# Patient Record
Sex: FEMALE | Race: WHITE | NOT HISPANIC OR LATINO | Employment: FULL TIME | URBAN - METROPOLITAN AREA
[De-identification: names, ages, dates, MRNs, and addresses within clinical notes are randomized per-mention and may not be internally consistent; named-entity substitution may affect disease eponyms.]

---

## 2023-02-21 LAB
ALANINE AMINOTRANSFERASE (SGPT) (U/L) IN SER/PLAS: 11 U/L (ref 7–45)
ALBUMIN (G/DL) IN SER/PLAS: 4.6 G/DL (ref 3.4–5)
ALKALINE PHOSPHATASE (U/L) IN SER/PLAS: 64 U/L (ref 33–110)
ANION GAP IN SER/PLAS: 12 MMOL/L (ref 10–20)
ASPARTATE AMINOTRANSFERASE (SGOT) (U/L) IN SER/PLAS: 14 U/L (ref 9–39)
BILIRUBIN TOTAL (MG/DL) IN SER/PLAS: 0.7 MG/DL (ref 0–1.2)
CALCIDIOL (25 OH VITAMIN D3) (NG/ML) IN SER/PLAS: 16 NG/ML
CALCIUM (MG/DL) IN SER/PLAS: 9.6 MG/DL (ref 8.6–10.6)
CARBON DIOXIDE, TOTAL (MMOL/L) IN SER/PLAS: 28 MMOL/L (ref 21–32)
CHLORIDE (MMOL/L) IN SER/PLAS: 103 MMOL/L (ref 98–107)
CHOLESTEROL (MG/DL) IN SER/PLAS: 195 MG/DL (ref 0–199)
CHOLESTEROL IN HDL (MG/DL) IN SER/PLAS: 61.5 MG/DL
CHOLESTEROL/HDL RATIO: 3.2
CREATININE (MG/DL) IN SER/PLAS: 0.93 MG/DL (ref 0.5–1.05)
ERYTHROCYTE DISTRIBUTION WIDTH (RATIO) BY AUTOMATED COUNT: 13.2 % (ref 11.5–14.5)
ERYTHROCYTE MEAN CORPUSCULAR HEMOGLOBIN CONCENTRATION (G/DL) BY AUTOMATED: 31.5 G/DL (ref 32–36)
ERYTHROCYTE MEAN CORPUSCULAR VOLUME (FL) BY AUTOMATED COUNT: 88 FL (ref 80–100)
ERYTHROCYTES (10*6/UL) IN BLOOD BY AUTOMATED COUNT: 5.15 X10E12/L (ref 4–5.2)
GFR FEMALE: 80 ML/MIN/1.73M2
GLUCOSE (MG/DL) IN SER/PLAS: 83 MG/DL (ref 74–99)
HEMATOCRIT (%) IN BLOOD BY AUTOMATED COUNT: 45.1 % (ref 36–46)
HEMOGLOBIN (G/DL) IN BLOOD: 14.2 G/DL (ref 12–16)
LDL: 118 MG/DL (ref 0–99)
LEUKOCYTES (10*3/UL) IN BLOOD BY AUTOMATED COUNT: 5.7 X10E9/L (ref 4.4–11.3)
NRBC (PER 100 WBCS) BY AUTOMATED COUNT: 0 /100 WBC (ref 0–0)
PLATELETS (10*3/UL) IN BLOOD AUTOMATED COUNT: 247 X10E9/L (ref 150–450)
POTASSIUM (MMOL/L) IN SER/PLAS: 4.6 MMOL/L (ref 3.5–5.3)
PROTEIN TOTAL: 7.2 G/DL (ref 6.4–8.2)
SODIUM (MMOL/L) IN SER/PLAS: 138 MMOL/L (ref 136–145)
TRIGLYCERIDE (MG/DL) IN SER/PLAS: 79 MG/DL (ref 0–149)
UREA NITROGEN (MG/DL) IN SER/PLAS: 11 MG/DL (ref 6–23)
VLDL: 16 MG/DL (ref 0–40)

## 2024-03-26 ENCOUNTER — TELEPHONE (OUTPATIENT)
Dept: PRIMARY CARE | Facility: CLINIC | Age: 39
End: 2024-03-26
Payer: COMMERCIAL

## 2024-03-26 DIAGNOSIS — E03.9 HYPOTHYROIDISM, ADULT: Primary | ICD-10-CM

## 2024-03-26 RX ORDER — SULFACETAMIDE SODIUM AND SULFUR 9; 4.5 MG/G; MG/G
RINSE TOPICAL
COMMUNITY
Start: 2024-01-31

## 2024-03-26 RX ORDER — ERGOCALCIFEROL 1.25 MG/1
50000 CAPSULE ORAL
COMMUNITY
End: 2024-06-05 | Stop reason: ALTCHOICE

## 2024-03-26 RX ORDER — SERTRALINE HYDROCHLORIDE 100 MG/1
TABLET, FILM COATED ORAL
COMMUNITY

## 2024-03-26 RX ORDER — LEVOTHYROXINE SODIUM 175 UG/1
175 TABLET ORAL DAILY
COMMUNITY
End: 2024-03-26 | Stop reason: SDUPTHER

## 2024-03-26 RX ORDER — SPIRONOLACTONE 50 MG/1
TABLET, FILM COATED ORAL
COMMUNITY

## 2024-03-26 RX ORDER — LEVOTHYROXINE SODIUM 175 UG/1
175 TABLET ORAL DAILY
Qty: 90 TABLET | Refills: 0 | Status: SHIPPED | OUTPATIENT
Start: 2024-03-26 | End: 2024-04-02

## 2024-04-02 DIAGNOSIS — E03.9 HYPOTHYROIDISM, ADULT: ICD-10-CM

## 2024-04-02 RX ORDER — LEVOTHYROXINE SODIUM 175 UG/1
175 TABLET ORAL DAILY
Qty: 90 TABLET | Refills: 0 | Status: SHIPPED | OUTPATIENT
Start: 2024-04-02 | End: 2024-06-07 | Stop reason: SDUPTHER

## 2024-06-05 ENCOUNTER — OFFICE VISIT (OUTPATIENT)
Dept: ENDOCRINOLOGY | Facility: CLINIC | Age: 39
End: 2024-06-05
Payer: COMMERCIAL

## 2024-06-05 ENCOUNTER — LAB (OUTPATIENT)
Dept: LAB | Facility: LAB | Age: 39
End: 2024-06-05
Payer: COMMERCIAL

## 2024-06-05 VITALS
BODY MASS INDEX: 27.5 KG/M2 | WEIGHT: 196.4 LBS | RESPIRATION RATE: 16 BRPM | SYSTOLIC BLOOD PRESSURE: 112 MMHG | HEIGHT: 71 IN | DIASTOLIC BLOOD PRESSURE: 60 MMHG | HEART RATE: 61 BPM

## 2024-06-05 DIAGNOSIS — E03.9 HYPOTHYROIDISM, UNSPECIFIED TYPE: ICD-10-CM

## 2024-06-05 DIAGNOSIS — C73 THYROID CANCER (MULTI): ICD-10-CM

## 2024-06-05 DIAGNOSIS — C73 THYROID CANCER (MULTI): Primary | ICD-10-CM

## 2024-06-05 LAB
T4 FREE SERPL-MCNC: 1.44 NG/DL (ref 0.78–1.48)
TSH SERPL-ACNC: 0.68 MIU/L (ref 0.44–3.98)

## 2024-06-05 PROCEDURE — 86800 THYROGLOBULIN ANTIBODY: CPT

## 2024-06-05 PROCEDURE — 84439 ASSAY OF FREE THYROXINE: CPT

## 2024-06-05 PROCEDURE — 1036F TOBACCO NON-USER: CPT | Performed by: INTERNAL MEDICINE

## 2024-06-05 PROCEDURE — 36415 COLL VENOUS BLD VENIPUNCTURE: CPT

## 2024-06-05 PROCEDURE — 84432 ASSAY OF THYROGLOBULIN: CPT

## 2024-06-05 PROCEDURE — 84443 ASSAY THYROID STIM HORMONE: CPT

## 2024-06-05 PROCEDURE — 99213 OFFICE O/P EST LOW 20 MIN: CPT | Performed by: INTERNAL MEDICINE

## 2024-06-05 RX ORDER — TRETINOIN 0.25 MG/G
CREAM TOPICAL
COMMUNITY
Start: 2024-03-28 | End: 2024-06-05 | Stop reason: ALTCHOICE

## 2024-06-05 ASSESSMENT — ENCOUNTER SYMPTOMS
SHORTNESS OF BREATH: 0
VOMITING: 0
FEVER: 0
COUGH: 0
HEADACHES: 0
DIARRHEA: 0
NAUSEA: 0
CHILLS: 0
PALPITATIONS: 0
FATIGUE: 0

## 2024-06-05 NOTE — PROGRESS NOTES
Endocrinology: Follow up visit  Subjective   Patient ID: Catrina Bonilla is a 38 y.o. female who presents for Hypothyroidism and Thyroid Cancer.    PCP: Katie Kelly, DO [place] last visit  doing well.   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  Weight down 10 lbs.   Taking t4 as directed.  Feels fine.    PTC 4 cm. s/p completion thyroidectomy and ovalles 4/2018.  2019: thyrogen testing: tg negative  2022 us ok    Review of Systems   Constitutional:  Negative for chills, fatigue and fever.   Respiratory:  Negative for cough and shortness of breath.    Cardiovascular:  Negative for chest pain and palpitations.   Gastrointestinal:  Negative for diarrhea, nausea and vomiting.   Neurological:  Negative for headaches.       There is no problem list on file for this patient.       Home Meds:  Current Outpatient Medications   Medication Instructions    sertraline (Zoloft) 100 mg tablet TAKE 1 TABLET BY MOUTH EVERY DAY IN THE MORNING AS DIRECTED    spironolactone (Aldactone) 50 mg tablet TAKE ONE TABLET DAILY. MAKE SURE TO STAY HYDRATED WHILE TAKING THIS MEDICATION.    sulfacetamide sodium-sulfur 9-4.5 % cleanser WASH FACE & BACK ONCE DAILY.    Synthroid 175 mcg, oral, Daily        Allergies   Allergen Reactions    Adhesive Tape-Silicones Rash        Objective   Vitals:    06/05/24 0951   BP: 112/60   Pulse: 61   Resp: 16      Vitals:    06/05/24 0951   Weight: 89.1 kg (196 lb 6.4 oz)      Body mass index is 27.39 kg/m².   Physical Exam  Constitutional:       Appearance: Normal appearance. She is overweight.   HENT:      Head: Normocephalic and atraumatic.   Neck:      Comments: No palpable thyroid gland  Cardiovascular:      Rate and Rhythm: Normal rate and regular rhythm.      Heart sounds: No murmur heard.     No gallop.   Pulmonary:      Effort: Pulmonary effort is normal.      Breath sounds: Normal breath sounds.   Abdominal:      Palpations: Abdomen is soft.      Comments: benign   Neurological:       "General: No focal deficit present.      Mental Status: She is alert and oriented to person, place, and time.      Deep Tendon Reflexes: Reflexes are normal and symmetric.   Psychiatric:         Behavior: Behavior is cooperative.         Labs:  Lab Results   Component Value Date    HGBA1C 4.8 02/06/2023    TSH 0.46 02/06/2023    FREET4 1.17 02/06/2023      No results found for: \"PR1\", \"THYROIDPAB\", \"TSI\"     Assessment/Plan   Problem List Items Addressed This Visit    None  Visit Diagnoses       Thyroid cancer (Multi)    -  Primary    Relevant Orders    Thyroid Stimulating Hormone    Thyroxine, Free    Thyroglobulin and Antithyroglobulin    US neck    Hypothyroidism, unspecified type              Labs and us due  Discussed 5 yrs of surveillance with us: if this years is ok will only need yearly labs  Follow up in one year    Electronically signed by:  Salima Trejo MD 06/05/24 10:18 AM              "

## 2024-06-07 DIAGNOSIS — E03.9 HYPOTHYROIDISM, ADULT: ICD-10-CM

## 2024-06-07 LAB
BILL ONLY-THYROGLOBULIN: NORMAL
THYROGLOB AB SERPL-ACNC: <0.9 IU/ML (ref 0–4)
THYROGLOB SERPL-MCNC: <0.1 NG/ML (ref 1.3–31.8)
THYROGLOB SERPL-MCNC: ABNORMAL NG/ML (ref 1.3–31.8)

## 2024-06-07 RX ORDER — LEVOTHYROXINE SODIUM 175 UG/1
175 TABLET ORAL DAILY
Qty: 90 TABLET | Refills: 3 | Status: SHIPPED | OUTPATIENT
Start: 2024-06-07

## 2024-06-18 ENCOUNTER — HOSPITAL ENCOUNTER (OUTPATIENT)
Dept: RADIOLOGY | Facility: CLINIC | Age: 39
Discharge: HOME | End: 2024-06-18
Payer: COMMERCIAL

## 2024-06-18 DIAGNOSIS — C73 THYROID CANCER (MULTI): ICD-10-CM

## 2024-06-18 PROCEDURE — 76536 US EXAM OF HEAD AND NECK: CPT

## 2024-06-18 PROCEDURE — 76536 US EXAM OF HEAD AND NECK: CPT | Performed by: STUDENT IN AN ORGANIZED HEALTH CARE EDUCATION/TRAINING PROGRAM

## 2024-06-25 ENCOUNTER — OFFICE VISIT (OUTPATIENT)
Dept: ORTHOPEDIC SURGERY | Facility: HOSPITAL | Age: 39
End: 2024-06-25
Payer: COMMERCIAL

## 2024-06-25 ENCOUNTER — HOSPITAL ENCOUNTER (OUTPATIENT)
Dept: RADIOLOGY | Facility: HOSPITAL | Age: 39
Discharge: HOME | End: 2024-06-25
Payer: COMMERCIAL

## 2024-06-25 VITALS — HEIGHT: 71 IN | WEIGHT: 196 LBS | BODY MASS INDEX: 27.44 KG/M2

## 2024-06-25 DIAGNOSIS — M54.50 LUMBAR PAIN WITH RADIATION DOWN LEFT LEG: Primary | ICD-10-CM

## 2024-06-25 DIAGNOSIS — M54.50 LUMBAR PAIN WITH RADIATION DOWN LEFT LEG: ICD-10-CM

## 2024-06-25 DIAGNOSIS — M79.605 LUMBAR PAIN WITH RADIATION DOWN LEFT LEG: ICD-10-CM

## 2024-06-25 DIAGNOSIS — M79.605 LUMBAR PAIN WITH RADIATION DOWN LEFT LEG: Primary | ICD-10-CM

## 2024-06-25 PROCEDURE — 99204 OFFICE O/P NEW MOD 45 MIN: CPT

## 2024-06-25 PROCEDURE — 72110 X-RAY EXAM L-2 SPINE 4/>VWS: CPT | Performed by: RADIOLOGY

## 2024-06-25 PROCEDURE — 99214 OFFICE O/P EST MOD 30 MIN: CPT

## 2024-06-25 PROCEDURE — 72110 X-RAY EXAM L-2 SPINE 4/>VWS: CPT

## 2024-06-25 RX ORDER — MELOXICAM 15 MG/1
15 TABLET ORAL DAILY
Qty: 30 TABLET | Refills: 0 | Status: SHIPPED | OUTPATIENT
Start: 2024-06-25 | End: 2024-07-25

## 2024-06-25 RX ORDER — METHOCARBAMOL 500 MG/1
500 TABLET, FILM COATED ORAL 3 TIMES DAILY PRN
Qty: 60 TABLET | Refills: 2 | Status: SHIPPED | OUTPATIENT
Start: 2024-06-25 | End: 2024-08-24

## 2024-06-25 RX ORDER — METHYLPREDNISOLONE 4 MG/1
TABLET ORAL
Qty: 21 TABLET | Refills: 1 | Status: SHIPPED | OUTPATIENT
Start: 2024-06-25 | End: 2024-07-02

## 2024-06-25 ASSESSMENT — PAIN DESCRIPTION - DESCRIPTORS: DESCRIPTORS: BURNING;ACHING;SHARP;SHOOTING

## 2024-06-25 ASSESSMENT — PAIN SCALES - GENERAL: PAINLEVEL_OUTOF10: 10 - WORST POSSIBLE PAIN

## 2024-06-25 ASSESSMENT — PAIN - FUNCTIONAL ASSESSMENT: PAIN_FUNCTIONAL_ASSESSMENT: 0-10

## 2024-06-25 NOTE — PROGRESS NOTES
Catrina Bonilla  is a 38 y.o. year-old  female. she  is a new patient to our office and presents with a chief complaint of  low back and left leg pain. Hx of lumbar back injections in 2018 with goo drelief lasting until onset of her symptoms with started 2-3 weeks ago after lifting heavy objects.   Denies bowl bladder dysfunction numbness tingling weakness or falls.     Pain:        Severity:  Catrina Bonilla states pain is: 10/10 at it's worst. On average pain is 5/10 (Scale 0-no pain, 10-worst pain)      Quality:  sharp.       Radiating: lateral food posterior thigh       Aggravating Factors: walking      Alleviating Factors:  rest.      Associated symptoms:  no numbness, or tingling in the bilateral lower extremities; no balance problems or new bowel/bladder problems.    Physical Therapy/Occupational Therapy/Other Modalities:  hasn't tried      Topicals: hasn't helped     Medications:  NSAIDs: helpful   Steroid: hasn't tried  Muscle relaxer: hasn't tried       Past Medical, Family, and Social History reviewed   Review of Systems  A complete review of systems was conducted, pertinent only to the HPI noted above.  Constitutional: None  Eyes: No additions to above history  Ears, Nose, Throat: No additions to above history  Cardiovascular: No additions to above history  Respiratory: No additions to above history  GI: No additions to above history  : No additions to above history  Skin/Neuro: No additions to above history  Endocrine/Heme/Lymph: No additions to above history  Immunologic: No additions to above history  Psychiatric: No additions to above history  Musculoskeletal: see above    GEN: Alert and Oriented x 3  Constitutional: Well appearing , in no apparent distress.  Skin: No rashes, erythema, or induration around knee    Gait:   - Antalgic slow   - Without assistive device   - able to heel walk, able to toe walk    Inspection:   - Scoliosis/Kyphosis: no  - No erythema, swelling/edema, ecchymosis or deformity  noted  - normal muscle bulk of bilateral lower extremity     Sensation:  - Intact to light touch in bilateral lower extremity    Palpation:  - No tenderness to palpation of bilateral greater trochanter  - tenderness to palpation of left sacroiliac joint  - No tenderness to palpation of bilateral spinal paraspinals at the level of L4-L5  - No tenderness to palpation of spinous process at the level of L4-L5    Range of Motion:  - Full painful thoracolumbar flexion>extension  - Full painless bilateral hip flexion/internal rotation/external rotation    Strength:  Side Hip Flexion (L2) Knee Extension (L3) Hip Adduction  (L2-L4) Hip Abduction  (L5-S1)   Right 5 5 5 5   Left 5 5 5 5     Reflexes:  - Disc/Nerve root: (SLR): painful but negative 2  - Facet loading: neg b/l   - SI Joint (Compression, Distraction, Gaenslen, and Thigh thrust): neg   - KORINA: neg   - FADIR: neg       Assessment     Catrina is a 38 y.o. female with significant past medical history of thyroid cancer, who presents with radicular low back pain .  The patient's symptoms, clinical exam and imaging studies are suggestive of L4-L5 lumbar radiculitis.  The other possible differential diagnosis(es) include(s):  discogenic pain, myofacial pain .      Plan     At this time, I would like to make the following recommendation/plan:  1.  Physical Therapy: referral placed   2.  Medication: meloxicam, methocarbamol, medrol dose pack(2x) discussed risk and benefits and proper administration.   3.  Injections: nbot idicated at this time. .  4.  Imaging: Interpreted Xray of lumbar spine 06/25/24: curved lumbar spine likely postural L4-L5 significant disc space narrowing with calcific disc (suspected). Spondylosis of lower lumbar spine.   5.  Referrals: medical  spine. Patient provided office contact info.       Imaging and Other Studies:    US neck    Result Date: 6/19/2024  Interpreted By:  Jacek Alcantar, STUDY: US NECK;  6/18/2024 10:49 am   INDICATION:  Signs/Symptoms:thyroid cancer.   COMPARISON: 08/09/2022   ACCESSION NUMBER(S): [id number]   ORDERING CLINICIAN: ALON TRACEY   TECHNIQUE: Multiple grayscale and color Doppler static and dynamic images of the neck were obtained at the symptomatic area of clinically suspected abnormality.   FINDINGS: Status post thyroidectomy. No suspicious mass within the thyroidectomy bed.   Bilateral neck ultrasound, demonstrates multiple predominantly benign-appearing lymph nodes with varying degree of fatty hilum. On the right there is a 12 x 6 x 14 mm level 1 node, a 15 x 7 x 8 mm level 2A node and a 17 x 5 x 9 mm level 2 B nodes. The right level 2 nodes have not significantly changed in size and morphology since 2022.   On the left there is a 9 x 6 x 10 mm level 2A node, previously 9 x 5 x 5 mm. Follow-up as clinically indicated.       See above.       Signed by: Jacek Alcantar 6/19/2024 10:23 PM Dictation workstation:   [id number]

## 2024-06-27 ENCOUNTER — HOSPITAL ENCOUNTER (EMERGENCY)
Facility: HOSPITAL | Age: 39
Discharge: HOME | End: 2024-06-27
Attending: STUDENT IN AN ORGANIZED HEALTH CARE EDUCATION/TRAINING PROGRAM
Payer: COMMERCIAL

## 2024-06-27 ENCOUNTER — APPOINTMENT (OUTPATIENT)
Dept: RADIOLOGY | Facility: HOSPITAL | Age: 39
End: 2024-06-27
Payer: COMMERCIAL

## 2024-06-27 VITALS
TEMPERATURE: 97.7 F | DIASTOLIC BLOOD PRESSURE: 64 MMHG | RESPIRATION RATE: 14 BRPM | SYSTOLIC BLOOD PRESSURE: 100 MMHG | HEART RATE: 56 BPM | BODY MASS INDEX: 27.44 KG/M2 | OXYGEN SATURATION: 96 % | HEIGHT: 71 IN | WEIGHT: 196 LBS

## 2024-06-27 DIAGNOSIS — M54.16 LUMBAR RADICULOPATHY, ACUTE: ICD-10-CM

## 2024-06-27 DIAGNOSIS — M51.36 DEGENERATIVE DISC DISEASE, LUMBAR: Primary | ICD-10-CM

## 2024-06-27 LAB — HCG UR QL IA.RAPID: NEGATIVE

## 2024-06-27 PROCEDURE — 72131 CT LUMBAR SPINE W/O DYE: CPT

## 2024-06-27 PROCEDURE — 2500000004 HC RX 250 GENERAL PHARMACY W/ HCPCS (ALT 636 FOR OP/ED): Performed by: PHYSICIAN ASSISTANT

## 2024-06-27 PROCEDURE — 96372 THER/PROPH/DIAG INJ SC/IM: CPT | Performed by: PHYSICIAN ASSISTANT

## 2024-06-27 PROCEDURE — 81025 URINE PREGNANCY TEST: CPT | Performed by: PHYSICIAN ASSISTANT

## 2024-06-27 PROCEDURE — 99284 EMERGENCY DEPT VISIT MOD MDM: CPT | Mod: 25

## 2024-06-27 PROCEDURE — 72131 CT LUMBAR SPINE W/O DYE: CPT | Performed by: RADIOLOGY

## 2024-06-27 PROCEDURE — 2500000001 HC RX 250 WO HCPCS SELF ADMINISTERED DRUGS (ALT 637 FOR MEDICARE OP): Performed by: PHYSICIAN ASSISTANT

## 2024-06-27 RX ORDER — OXYCODONE AND ACETAMINOPHEN 5; 325 MG/1; MG/1
1 TABLET ORAL ONCE
Status: COMPLETED | OUTPATIENT
Start: 2024-06-27 | End: 2024-06-27

## 2024-06-27 RX ORDER — OXYCODONE AND ACETAMINOPHEN 5; 325 MG/1; MG/1
1 TABLET ORAL EVERY 6 HOURS
Qty: 12 TABLET | Refills: 0 | Status: SHIPPED | OUTPATIENT
Start: 2024-06-27 | End: 2024-06-30

## 2024-06-27 RX ORDER — ORPHENADRINE CITRATE 30 MG/ML
30 INJECTION INTRAMUSCULAR; INTRAVENOUS ONCE
Status: COMPLETED | OUTPATIENT
Start: 2024-06-27 | End: 2024-06-27

## 2024-06-27 ASSESSMENT — PAIN DESCRIPTION - LOCATION
LOCATION: BACK

## 2024-06-27 ASSESSMENT — PAIN DESCRIPTION - PROGRESSION
CLINICAL_PROGRESSION: GRADUALLY IMPROVING
CLINICAL_PROGRESSION: GRADUALLY IMPROVING

## 2024-06-27 ASSESSMENT — PAIN DESCRIPTION - PAIN TYPE: TYPE: ACUTE PAIN;NEUROPATHIC PAIN

## 2024-06-27 ASSESSMENT — COLUMBIA-SUICIDE SEVERITY RATING SCALE - C-SSRS
1. IN THE PAST MONTH, HAVE YOU WISHED YOU WERE DEAD OR WISHED YOU COULD GO TO SLEEP AND NOT WAKE UP?: NO
6. HAVE YOU EVER DONE ANYTHING, STARTED TO DO ANYTHING, OR PREPARED TO DO ANYTHING TO END YOUR LIFE?: NO
2. HAVE YOU ACTUALLY HAD ANY THOUGHTS OF KILLING YOURSELF?: NO

## 2024-06-27 ASSESSMENT — PAIN SCALES - GENERAL
PAINLEVEL_OUTOF10: 10 - WORST POSSIBLE PAIN
PAINLEVEL_OUTOF10: 5 - MODERATE PAIN
PAINLEVEL_OUTOF10: 10 - WORST POSSIBLE PAIN
PAINLEVEL_OUTOF10: 7

## 2024-06-27 ASSESSMENT — PAIN DESCRIPTION - FREQUENCY
FREQUENCY: INTERMITTENT
FREQUENCY: CONSTANT/CONTINUOUS
FREQUENCY: CONSTANT/CONTINUOUS

## 2024-06-27 ASSESSMENT — PAIN DESCRIPTION - DESCRIPTORS: DESCRIPTORS: SHARP;SHOOTING

## 2024-06-27 ASSESSMENT — PAIN - FUNCTIONAL ASSESSMENT
PAIN_FUNCTIONAL_ASSESSMENT: 0-10
PAIN_FUNCTIONAL_ASSESSMENT: 0-10

## 2024-06-27 NOTE — ED PROVIDER NOTES
HPI     CC: Back Pain     HPI: Catrina Bonilla is a 38 y.o. female with past medical history of anxiety/depression and hypothyroidism presents with  with concern for worsening back pain.  Patient reports that about 2 weeks ago, she was lifting something heavy into her car and started developing back pain the next day.  She does have a history of back issues dating back to 2018 for which she received joint injections and has not had any problems in years.  Denies previous surgery.  She states that she went to the orthopedic center a few days ago and was given prescriptions for naproxen, Robaxin, and methylprednisolone which she has been taking for 2 days without any relief.  She states that her symptoms are worsening which includes left lower back pain radiating down the left leg with numbness and tingling/sharp shooting pain and weakness.  She states that she did not have all of the symptoms at the beginning but only had pain.  She reports no loss of bowel or bladder function, weight loss, or fevers or chills.    ROS: 10-point review of systems was performed and is otherwise negative except as noted in HPI.      Past Medical History: Noncontributory except per HPI     Past Surgical History: Noncontributory except per HPI     Family History: Reviewed and noncontributory     Social History:  Noncontributory except per HPI       Allergies   Allergen Reactions    Adhesive Tape-Silicones Rash       Home Meds:   Current Outpatient Medications   Medication Instructions    levothyroxine (SYNTHROID) 175 mcg, oral, Daily    meloxicam (MOBIC) 15 mg, oral, Daily    methocarbamol (ROBAXIN) 500 mg, oral, 3 times daily PRN    methylPREDNISolone (Medrol Dospak) 4 mg tablets Take as directed on package.    oxyCODONE-acetaminophen (Percocet) 5-325 mg tablet 1 tablet, oral, Every 6 hours, As needed for pain    sertraline (Zoloft) 100 mg tablet TAKE 1 TABLET BY MOUTH EVERY DAY IN THE MORNING AS DIRECTED    spironolactone  "(Aldactone) 50 mg tablet TAKE ONE TABLET DAILY. MAKE SURE TO STAY HYDRATED WHILE TAKING THIS MEDICATION.    sulfacetamide sodium-sulfur 9-4.5 % cleanser WASH FACE & BACK ONCE DAILY.        ED Triage Vitals [06/27/24 1031]   Temperature Heart Rate Respirations BP   36.5 °C (97.7 °F) 70 18 111/53      Pulse Ox Temp src Heart Rate Source Patient Position   98 % -- -- --      BP Location FiO2 (%)     -- --         Heart Rate:  [56-70]   Temperature:  [36.5 °C (97.7 °F)]   Respirations:  [14-18]   BP: (100-111)/(53-64)   Height:  [181 cm (5' 11.26\")]   Weight:  [88.9 kg (196 lb)]   Pulse Ox:  [96 %-98 %]      Physical Exam:  Physical Exam  Constitutional:       General: She is not in acute distress.     Appearance: Normal appearance. She is not ill-appearing.   HENT:      Head: Normocephalic and atraumatic.      Right Ear: External ear normal.      Left Ear: External ear normal.      Nose: Nose normal.      Mouth/Throat:      Mouth: Mucous membranes are moist.   Eyes:      Extraocular Movements: Extraocular movements intact.      Conjunctiva/sclera: Conjunctivae normal.      Pupils: Pupils are equal, round, and reactive to light.   Cardiovascular:      Rate and Rhythm: Normal rate and regular rhythm.      Pulses: Normal pulses.   Pulmonary:      Effort: Pulmonary effort is normal. No respiratory distress.      Breath sounds: Normal breath sounds.   Abdominal:      General: Abdomen is flat.      Palpations: Abdomen is soft.      Tenderness: There is no abdominal tenderness.   Musculoskeletal:      Cervical back: Normal range of motion and neck supple.      Comments: No midline lumbar bony spinal tenderness.  Left SI joint tenderness with left paraspinal tenderness.  Patient unable to lift left leg off of the bed due to severe pain and reported weakness.  Sharp shooting pains reported down left leg with worsening numbness/tingling.  2+ DP pulse.   Skin:     General: Skin is warm and dry.      Capillary Refill: Capillary " refill takes less than 2 seconds.   Neurological:      General: No focal deficit present.      Mental Status: She is alert and oriented to person, place, and time.   Psychiatric:         Mood and Affect: Mood normal.          Diagnostic Results        Labs Reviewed   HCG, URINE, QUALITATIVE - Normal       Result Value    HCG, Urine NEGATIVE           CT lumbar spine wo IV contrast   Final Result   Severe degenerative disc disease at L4-5, out of proportion to much   milder degenerative changes throughout the remainder of the lumbar   spine. Left paracentral disc herniation at L5-S1 causing moderate   canal stenosis, disc extending into the left lateral recess.        MACRO:   None        Signed by: Paul Mares 6/27/2024 1:02 PM   Dictation workstation:   [id number]                    No data recorded                Procedure  Procedures    ED Course & MDM   Assessment/Plan:     Medications   oxyCODONE-acetaminophen (Percocet) 5-325 mg per tablet 1 tablet (1 tablet oral Given 6/27/24 1129)   orphenadrine (Norflex) injection 30 mg (30 mg intramuscular Given 6/27/24 1135)        Diagnoses as of 06/27/24 1331   Degenerative disc disease, lumbar   Lumbar radiculopathy, acute       Medical Decision Making    Catrina Bonilla is a 38 y.o. female with past medical history of anxiety/depression and hypothyroidism presents with  with concern for worsening back pain.  Patient is nontoxic-appearing but uncomfortable.  Vital signs are normal.  Based on her symptoms and presentation, differential diagnosis includes severe muscle strain, lumbar disc bulge, sciatica.  Upon chart review, patient was seen 2 days ago and underwent lumbar x-rays which showed severe spondylosis at L4-L5 along with moderate facet disease.  Given the patient's symptoms are progressing and refractory to outpatient management, will obtain CT of the lumbar spine and trial Percocet and IM Norflex for pain control.  Patient was reevaluated and her pain  was significantly improved to a 5 from a 10.  She was able to ambulate to the bathroom and lift her left leg slightly.  CT scan showed severe degenerative disc disease at L4-5 out of proportion to the remainder of the spine.  Also has left paracentral disc herniation at L5-S1 causing moderate canal stenosis with disc extending into the left lateral recess.  Given that the patient has no red flag symptoms of cord compression, feel that this can be managed as an outpatient after discussion with attending, Dr. Mcwilliams.    Degenerative disc disease with disc herniation: Patient was educated about her findings.  We discussed that she will ultimately need to see a spine surgeon for definitive treatment options.  This was ordered as a stat follow-up as well as pain management for possible injections to temporize her.  She is supposed to go on a trip to [place] next week, will give disability placard in case symptoms are persisting.  Recommended that she continue taking the steroid pack, muscle relaxants, and NSAIDs as well as a few days of Percocet since this was the only thing that gave her relief.  May not drive while taking Percocet or muscle relaxants.  We discussed that she should also make the appointment with physical therapy and start that treatment as well.  She may ultimately need surgery for this.  We discussed that if her symptoms acutely worsen to include saddle anesthesias, loss of bowel or bladder function, new or worsening weakness, or increasing pain, she should return to the nearest emergency department for reevaluation.  Patient agreeable to plan of care and felt comfortable returning home.    Disposition: Home    ED Prescriptions       Medication Sig Dispense Start Date End Date Auth. Provider    oxyCODONE-acetaminophen (Percocet) 5-325 mg tablet Take 1 tablet by mouth every 6 hours for 3 days. As needed for pain 12 tablet 6/27/2024 6/30/2024 Yeni Craig PA-C            Social Determinants Affecting  Care: none     Yeni Craig PA-C    This note was dictated by speech recognition. Minor errors in transcription may be present.     Yeni Craig PA-C  06/27/24 7660

## 2024-06-27 NOTE — ED TRIAGE NOTES
"C/O LOWER BACK PAIN RADIATING INTO LEFT LEG, ONSET 2 WEEKS, PT STATES \"I LIFTED SOME FURNITURE INTO MY CAR AND IT HAS BEEN HURTING SINCE\" DENIES LOSS OF BOWEL/BLADDER CONTROL, PT HAS BEEN TAKING ADVIL/TYLENOL/STEROIDS/MUSCLE RELAXER'S WITH NO RELIEF , AMBULATED TRIAGE    "

## 2024-07-01 ENCOUNTER — APPOINTMENT (OUTPATIENT)
Dept: ORTHOPEDIC SURGERY | Facility: CLINIC | Age: 39
End: 2024-07-01
Payer: COMMERCIAL

## 2024-07-01 VITALS — WEIGHT: 196 LBS | BODY MASS INDEX: 27.44 KG/M2 | HEIGHT: 71 IN

## 2024-07-01 DIAGNOSIS — M54.16 LUMBAR RADICULOPATHY, ACUTE: ICD-10-CM

## 2024-07-01 DIAGNOSIS — M51.36 DEGENERATIVE DISC DISEASE, LUMBAR: ICD-10-CM

## 2024-07-01 PROCEDURE — 99214 OFFICE O/P EST MOD 30 MIN: CPT | Performed by: PHYSICIAN ASSISTANT

## 2024-07-01 PROCEDURE — 1036F TOBACCO NON-USER: CPT | Performed by: PHYSICIAN ASSISTANT

## 2024-07-01 RX ORDER — PREDNISONE 20 MG/1
20 TABLET ORAL DAILY
Qty: 7 TABLET | Refills: 0 | Status: SHIPPED | OUTPATIENT
Start: 2024-07-01

## 2024-07-01 RX ORDER — OXYCODONE HYDROCHLORIDE 5 MG/1
5 TABLET ORAL EVERY 12 HOURS PRN
Qty: 3 TABLET | Refills: 0 | Status: SHIPPED | OUTPATIENT
Start: 2024-07-01

## 2024-07-01 ASSESSMENT — PAIN SCALES - GENERAL: PAINLEVEL_OUTOF10: 2

## 2024-07-01 ASSESSMENT — PAIN - FUNCTIONAL ASSESSMENT: PAIN_FUNCTIONAL_ASSESSMENT: 0-10

## 2024-07-01 NOTE — PROGRESS NOTES
Catrina is a 38-year-old female reporting clinic today for evaluation of her chronic low back pain with left leg radiculopathy.    She was recently seen by SANTHOSH Pang at the Ortho walk-in clinic.  She was given prescriptions for meloxicam, methocarbamol, and methylprednisolone.  It was recommended she start physical therapy and was provided with a referral.  She was then seen in the ED on 6/27 and given a 3-day prescription for oxycodone which she has been taking sparingly.  He was also given a referral for pain medicine.    She has a history of chronic low back pain with left leg radiculopathy last treated in 2018 when she underwent epidural injections.  She felt these were very successful until recently.  Her current symptoms started approximately 2 weeks ago, she denies injury or trauma.  She has midline low back pain that radiates into beltline distribution.  She has pain radiating down her left leg, this radiates into her buttocks and down her lateral leg into her foot.  Her symptoms are increased with sitting.  She has no right leg symptoms.  She denies weakness, dragging or tripping over her feet.  She has full control of her bowel and bladder.  Overall her symptoms have started to improve since onset.    She is concerned because she is leaving for [place] on Friday and is worried her symptoms are going to worsen with traveling.    Treatment thus far has consisted of medication management, she has not yet scheduled physical therapy.    Family, social, and medical histories are obtained and reviewed.    ROS: All other systems have been reviewed and are negative except as previously noted in history of present illness.    Physical Exam:  Const: Well-appearing, well-nourished female in no distress.  Eyes: Normal appearing sclera and conjunctiva, no jaundice, pupils normal in appearance.  Resp: breathing comfortably, normal respiratory rate.  CV: No upper or lower extremity edema.  Musculoskeletal: Normal gait.   Lumbar ROM is supple.  Strength exam of the lower extremities reveals 5/5 strength in all major muscle groups.  Positive straight leg raise on the left.  Neuro: Sensation is intact and equal bilaterally. Deep tendon reflexes are normal and symmetric.  No clonus.  Skin: Intact without any lesions, normal turgor.  Psych: Alert and oriented x3, normal mood and affect.    I personally reviewed plain films and a CT scan of the lumbar spine from 6/25 and 6/27 respectively.  There is no evidence of acute fracture or instability.  There is to space narrowing with anterior and posterior osteophyte formation at L4-5.    I reviewed with her current workup and treatment recommendations.  I urged her to call to schedule physical therapy.  She can continue taking methocarbamol as needed for muscle spasms.  I did recommend she not refill the second Medrol Dosepak, and instead a prescription for prednisone 20 mg was sent to her pharmacy for inflammation.  She was given a refill of 3 tablets of oxycodone to help get her through her upcoming trip to [place].  She should be able to start physical therapy when she returns.  If she does not have improvement after 6 weeks physical therapy she should follow-up with me at which time consider an MRI of the lumbar spine.    **This note was dictated using speech recognition software and was not corrected for spelling or grammatical errors**

## 2024-07-05 ENCOUNTER — APPOINTMENT (OUTPATIENT)
Dept: PRIMARY CARE | Facility: CLINIC | Age: 39
End: 2024-07-05
Payer: COMMERCIAL

## 2024-07-30 ENCOUNTER — APPOINTMENT (OUTPATIENT)
Dept: ORTHOPEDIC SURGERY | Facility: CLINIC | Age: 39
End: 2024-07-30
Payer: COMMERCIAL

## 2024-10-07 ENCOUNTER — APPOINTMENT (OUTPATIENT)
Dept: OBSTETRICS AND GYNECOLOGY | Facility: CLINIC | Age: 39
End: 2024-10-07
Payer: COMMERCIAL

## 2024-10-07 VITALS
SYSTOLIC BLOOD PRESSURE: 104 MMHG | WEIGHT: 208 LBS | DIASTOLIC BLOOD PRESSURE: 60 MMHG | HEIGHT: 71 IN | BODY MASS INDEX: 29.12 KG/M2

## 2024-10-07 DIAGNOSIS — Z01.419 WELL WOMAN EXAM WITH ROUTINE GYNECOLOGICAL EXAM: Primary | ICD-10-CM

## 2024-10-07 DIAGNOSIS — Z12.31 BREAST CANCER SCREENING BY MAMMOGRAM: ICD-10-CM

## 2024-10-07 DIAGNOSIS — N92.1 MENORRHAGIA WITH IRREGULAR CYCLE: ICD-10-CM

## 2024-10-07 PROBLEM — N63.0 BREAST LUMP: Status: ACTIVE | Noted: 2024-10-07

## 2024-10-07 PROBLEM — L70.9 ACNE: Status: ACTIVE | Noted: 2024-10-07

## 2024-10-07 PROBLEM — S92.355A NONDISPLACED FRACTURE OF FIFTH METATARSAL BONE, LEFT FOOT, INITIAL ENCOUNTER FOR CLOSED FRACTURE: Status: RESOLVED | Noted: 2024-10-07 | Resolved: 2024-10-07

## 2024-10-07 PROBLEM — M79.672 LEFT FOOT PAIN: Status: RESOLVED | Noted: 2024-10-07 | Resolved: 2024-10-07

## 2024-10-07 PROBLEM — N64.4 BREAST PAIN IN FEMALE: Status: RESOLVED | Noted: 2024-10-07 | Resolved: 2024-10-07

## 2024-10-07 PROBLEM — N63.20 LEFT BREAST MASS: Status: ACTIVE | Noted: 2024-10-07

## 2024-10-07 PROBLEM — E03.9 HYPOTHYROIDISM: Status: ACTIVE | Noted: 2024-10-07

## 2024-10-07 PROBLEM — J38.3 LESION OF TRUE VOCAL CORD: Status: ACTIVE | Noted: 2024-10-07

## 2024-10-07 PROBLEM — R92.8 ABNORMAL MAMMOGRAPHY: Status: RESOLVED | Noted: 2024-10-07 | Resolved: 2024-10-07

## 2024-10-07 PROBLEM — Z85.850 HISTORY OF MALIGNANT NEOPLASM OF THYROID: Status: RESOLVED | Noted: 2024-10-07 | Resolved: 2024-10-07

## 2024-10-07 PROBLEM — C73 MALIGNANT NEOPLASM OF THYROID GLAND (MULTI): Status: RESOLVED | Noted: 2024-10-07 | Resolved: 2024-10-07

## 2024-10-07 PROBLEM — L72.9 SKIN CYST: Status: RESOLVED | Noted: 2024-10-07 | Resolved: 2024-10-07

## 2024-10-07 PROBLEM — J38.01 VOCAL CORD PARALYSIS, UNILATERAL PARTIAL: Status: ACTIVE | Noted: 2024-10-07
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
## 2024-10-07 PROBLEM — R49.0 HOARSENESS OF VOICE: Status: ACTIVE | Noted: 2024-10-07

## 2024-10-07 PROBLEM — R63.5 WEIGHT GAIN: Status: ACTIVE | Noted: 2024-10-07

## 2024-10-07 PROBLEM — C73 THYROID CANCER (MULTI): Status: RESOLVED | Noted: 2024-10-07 | Resolved: 2024-10-07

## 2024-10-07 PROBLEM — R61 NIGHT SWEATS: Status: ACTIVE | Noted: 2024-10-07

## 2024-10-07 PROBLEM — E55.9 VITAMIN D DEFICIENCY: Status: ACTIVE | Noted: 2024-10-07

## 2024-10-07 PROBLEM — N63.0 BREAST LUMP: Status: RESOLVED | Noted: 2024-10-07 | Resolved: 2024-10-07

## 2024-10-07 PROBLEM — T81.49XA POSTOPERATIVE WOUND INFECTION: Status: RESOLVED | Noted: 2024-10-07 | Resolved: 2024-10-07

## 2024-10-07 PROBLEM — N63.20 LEFT BREAST MASS: Status: RESOLVED | Noted: 2024-10-07 | Resolved: 2024-10-07

## 2024-10-07 PROBLEM — F41.9 ANXIETY: Status: ACTIVE | Noted: 2024-10-07

## 2024-10-07 PROCEDURE — 88175 CYTOPATH C/V AUTO FLUID REDO: CPT

## 2024-10-07 PROCEDURE — 99396 PREV VISIT EST AGE 40-64: CPT | Performed by: OBSTETRICS & GYNECOLOGY

## 2024-10-07 PROCEDURE — 3008F BODY MASS INDEX DOCD: CPT | Performed by: OBSTETRICS & GYNECOLOGY

## 2024-10-07 PROCEDURE — 87624 HPV HI-RISK TYP POOLED RSLT: CPT

## 2024-10-07 PROCEDURE — 1036F TOBACCO NON-USER: CPT | Performed by: OBSTETRICS & GYNECOLOGY

## 2024-10-07 RX ORDER — CLINDAMYCIN PHOSPHATE AND TRETINOIN GEL 1.2%/0.025% 10; .25 MG/G; MG/G
GEL TOPICAL
COMMUNITY
Start: 2024-01-31

## 2024-10-07 RX ORDER — ERGOCALCIFEROL 1.25 MG/1
CAPSULE ORAL
COMMUNITY
Start: 2023-02-27

## 2024-10-07 NOTE — PROGRESS NOTES
Chief Complaint   Patient presents with    Annual Exam     Annual Exam with Pap Smear and Order for Mammogram        C/O irregular menstrual cycles for the last 4-5 months patient states she was getting a period every couple weeks, however, they were not quite as heavy as they usually are.    Daughter in room with patient.      Periods have become increasingly irregular.  Sometimes occurring every 2 weeks.  Often longer but lighter.  With history of thyroid cancer gets thyroid function checked regularly.  Will arrange to check CBC and ferritin.    REVIEW OF SYSTEMS    Please see HPI for reported pertinent positives, which would supersede this ROS    Constitutional:  Denies fever, chills, wt gain or loss, fatigue    Genito-Urinary:  Denies genital lesion or sores, vaginal dryness, itching  or pain.  No abnormal vaginal discharge or unexplained vaginal bleeding.  No dysuria, urinary incontinence or frequency.  Denies pelvic pain, dysmenorrhea or dyspareunia.    Eyes:  Denies vision changes, dryness  ENT:  No hearing loss, sinus pain or congestion, nosebleeds  Cardiovascular:  No chest pain or palpitations  Respiratory:  No SOB, cough, wheezing  GI:  No Nausea, vomiting, diarrhea, constipation, abdominal pain  Musculoskeletal:  No new back pain. joint pain, peripheral edema  Skin:  No rash or skin lesion  Neurologic:  No HA, numbness or dizziness  Psychiatric:  No new anxiety or depression  Endocrine:  No loss of hair or hirsutism  Hematologic/lymphatic:  No swollen lymph nodes.  No reported tendency for easy bruising or bleeding    Patient admits to no other systemic complaints      Vitals:    10/07/24 1658   BP: 104/60       PHYSICAL EXAM:    PSYCH:  Pt is alert, oriented and cooperative    SKIN: warm, dry, w/o lesion    HEAD AND FACE:  External inspection of eyes, ears, functional cranial nerves normal and intact    THYROID:  No thyromegaly    CARDIOVASCULAR:  Warm and well Perfused    PULMONARY:  No respiratory  distress    ABDOMEN:  soft, nontender.  No mass or organomegaly.      BREAST:  Breasts are symmetric to inspection and palpation.  No mass palpable bilaterally.  There is no axillary lymphadenopathy    PELVIC:    External genitalia, urethra, perianal region normal to inspection and palpation if indicated.  No inguinal LA    Vagina without lesions.    Cervix seen and visually normal      Bimanual exam:      No pelvic mass palpable    Uterus nontender, midline in pelvis    No adnexal masses or tenderness    Assessment/Plan    Diagnoses and all orders for this visit:  Well woman exam with routine gynecological exam  -     THINPREP PAP TEST  Breast cancer screening by mammogram  -     BI mammo bilateral screening tomosynthesis; Future  Menorrhagia with irregular cycle - could consider trtment options such as P-IUD or ablation, medical mgmt, but check USN, labs  -     Ferritin; Future  -     CBC; Future  -     US pelvis; Future  -     Follicle Stimulating Hormone; Future

## 2024-10-17 ENCOUNTER — APPOINTMENT (OUTPATIENT)
Dept: RADIOLOGY | Facility: CLINIC | Age: 39
End: 2024-10-17
Payer: COMMERCIAL

## 2024-10-18 LAB
CYTOLOGY CMNT CVX/VAG CYTO-IMP: NORMAL
HPV HR 12 DNA GENITAL QL NAA+PROBE: NEGATIVE
HPV HR GENOTYPES PNL CVX NAA+PROBE: NEGATIVE
HPV16 DNA SPEC QL NAA+PROBE: NEGATIVE
HPV18 DNA SPEC QL NAA+PROBE: NEGATIVE
LAB AP HPV GENOTYPE QUESTION: YES
LAB AP HPV HR: NORMAL
LABORATORY COMMENT REPORT: NORMAL
PATH REPORT.TOTAL CANCER: NORMAL

## 2024-10-24 ENCOUNTER — APPOINTMENT (OUTPATIENT)
Dept: RADIOLOGY | Facility: CLINIC | Age: 39
End: 2024-10-24
Payer: COMMERCIAL

## 2024-10-31 ENCOUNTER — APPOINTMENT (OUTPATIENT)
Dept: RADIOLOGY | Facility: CLINIC | Age: 39
End: 2024-10-31
Payer: COMMERCIAL

## 2024-10-31 ENCOUNTER — HOSPITAL ENCOUNTER (OUTPATIENT)
Dept: RADIOLOGY | Facility: CLINIC | Age: 39
Discharge: HOME | End: 2024-10-31
Payer: COMMERCIAL

## 2024-10-31 VITALS — WEIGHT: 207.89 LBS | HEIGHT: 71 IN | BODY MASS INDEX: 29.1 KG/M2

## 2024-10-31 DIAGNOSIS — Z12.31 BREAST CANCER SCREENING BY MAMMOGRAM: ICD-10-CM

## 2024-10-31 PROCEDURE — 77063 BREAST TOMOSYNTHESIS BI: CPT

## 2024-11-07 ENCOUNTER — APPOINTMENT (OUTPATIENT)
Dept: RADIOLOGY | Facility: CLINIC | Age: 39
End: 2024-11-07
Payer: COMMERCIAL

## 2024-11-07 DIAGNOSIS — N92.1 MENORRHAGIA WITH IRREGULAR CYCLE: ICD-10-CM

## 2024-12-05 ENCOUNTER — APPOINTMENT (OUTPATIENT)
Dept: ENDOCRINOLOGY | Facility: CLINIC | Age: 39
End: 2024-12-05
Payer: COMMERCIAL

## 2025-01-06 ENCOUNTER — TELEPHONE (OUTPATIENT)
Dept: PRIMARY CARE | Facility: CLINIC | Age: 40
End: 2025-01-06
Payer: COMMERCIAL

## 2025-01-06 NOTE — TELEPHONE ENCOUNTER
Return VM. Patient was advice to go to Ortho in [facility]. Date and time will not work for patient

## 2025-02-04 ENCOUNTER — APPOINTMENT (OUTPATIENT)
Dept: PRIMARY CARE | Facility: CLINIC | Age: 40
End: 2025-02-04
Payer: COMMERCIAL

## 2025-02-04 VITALS
WEIGHT: 219.5 LBS | HEIGHT: 71 IN | BODY MASS INDEX: 30.73 KG/M2 | SYSTOLIC BLOOD PRESSURE: 99 MMHG | DIASTOLIC BLOOD PRESSURE: 66 MMHG | HEART RATE: 68 BPM | OXYGEN SATURATION: 98 %

## 2025-02-04 DIAGNOSIS — Z00.00 ANNUAL PHYSICAL EXAM: Primary | ICD-10-CM

## 2025-02-04 DIAGNOSIS — M77.11 LATERAL EPICONDYLITIS OF RIGHT ELBOW: ICD-10-CM

## 2025-02-04 PROCEDURE — 99396 PREV VISIT EST AGE 40-64: CPT | Performed by: INTERNAL MEDICINE

## 2025-02-04 PROCEDURE — 3008F BODY MASS INDEX DOCD: CPT | Performed by: INTERNAL MEDICINE

## 2025-02-04 ASSESSMENT — PATIENT HEALTH QUESTIONNAIRE - PHQ9
2. FEELING DOWN, DEPRESSED OR HOPELESS: NOT AT ALL
1. LITTLE INTEREST OR PLEASURE IN DOING THINGS: NOT AT ALL
SUM OF ALL RESPONSES TO PHQ9 QUESTIONS 1 AND 2: 0

## 2025-02-04 NOTE — PROGRESS NOTES
"Subjective   Patient ID: Catrina Bonilla is a 39 y.o. female who presents for Establish Care and Annual Exam (Blood work. Also has been having elbow pain. Would like to discuss losing weight. ).    HPI     Review of Systems    Objective   BP 99/66   Pulse 68   Ht 1.803 m (5' 10.98\")   Wt 99.6 kg (219 lb 8 oz)   SpO2 98%   BMI 30.63 kg/m²     Physical Exam    Assessment/Plan          "

## 2025-02-04 NOTE — PROGRESS NOTES
Subjective   Patient ID: Catrina Bonilla is a 39 y.o. female who presents for Establish Care and Annual Exam (Blood work. Also has been having elbow pain. Would like to discuss losing weight. ).  HPI    Patient is here for annual exam  Has difficulty losing weight and has recent weight gain and wondering if she can get medication for weight loss  She also has right elbow pain which gets worse with certain movements.  Has difficulty lifting  Sees dentist regularly.    Consumes healthy diet    does regular exercise,work is physical  pregnancy [id number]  No bladder symptoms  Cervical cancer screen current normal   No history of abnormal Pap  Mammogram done  Colonoscopy at 43  Medical conditions:hypothyroid  Vaccines:uptodate    Review of Systems  General: no fatigue, no fever, chills, or night sweats.  HEENT: No headache, dizziness, syncope. No blurred vision, double vision. No hearing loss, or ringing. No nasal discharge, sinus problems. No loose teeth, sore throat, hoarseness or neck stiffness.   Breast: No pain or discharge. No mass felt.   Respiratory: No cough, mucous in throat, hemoptysis, wheezing, or shortness of breath. No snoring.  Cardiac: No chest pain, palpitations, orthopnea. No leg swelling or claudication pain.   Gastrointestinal: No indigestion, heartburn, nausea, vomiting, diarrhea, constipation, rectal bleeding or hemorrhoids.  Genitourinary: No UTI symptoms, stones, incontinence.  OBGYN: No abnormal bleeding, No pelvic pain or bloating.  Endocrine: No excess thirst or urination.  Hematopoietic: No anemia, easy bruising or bleeding. No history of blood transfusion.  Neuro: No localized weakness, numbness or tingling. No tremor, history of seizure. No history of memory problems.  Psychological: No anxiety, depression or sleep disturbance.    HISTORY  Social History     Socioeconomic History    Marital status:      Spouse name: Not on file    Number of children: Not on file    Years of  education: Not on file    Highest education level: Not on file   Occupational History    Not on file   Tobacco Use    Smoking status: Never    Smokeless tobacco: Never   Vaping Use    Vaping status: Never Used   Substance and Sexual Activity    Alcohol use: Yes     Comment: rarely    Drug use: Never    Sexual activity: Yes     Partners: Male   Other Topics Concern    Not on file   Social History Narrative    Not on file     Social Drivers of Health     Financial Resource Strain: Not on file   Food Insecurity: Not on file   Transportation Needs: Not on file   Physical Activity: Not on file   Stress: Not on file   Social Connections: Not on file   Intimate Partner Violence: Not on file   Housing Stability: Not on file     Family History   Problem Relation Name Age of Onset    Hashimoto's thyroiditis Mother      Parkinsonism Father       Past Medical History:   Diagnosis Date    Abnormal mammography 10/07/2024    Breast lump 10/07/2024    Dog bite 10/07/2024    Fibrocystic breast     History of malignant neoplasm of thyroid 10/07/2024    Left breast mass 10/07/2024    Left foot pain 10/07/2024    Malignant neoplasm of thyroid gland (Multi) 10/07/2024    Nondisplaced fracture of fifth metatarsal bone, left foot, initial encounter for closed fracture 10/07/2024    Nontoxic multinodular goiter 12/18/2017    Multinodular goiter    Nontoxic single thyroid nodule     Solitary thyroid nodule    Personal history of other endocrine, nutritional and metabolic disease 07/31/2015    History of goiter    Postoperative wound infection 10/07/2024    Skin cyst 10/07/2024    Thyroid cancer (Multi) 10/07/2024     Past Surgical History:   Procedure Laterality Date    BREAST BIOPSY Left     OTHER SURGICAL HISTORY  02/06/2018    Thyroid Surgery Thyroid Lobectomy Left Lobe    TOTAL THYROIDECTOMY  04/11/2018    Thyroid Surgery Total Thyroidectomy    US GUIDED THYROID BIOPSY  11/10/2015    US GUIDED THYROID BIOPSY 11/10/2015 [facility]  "LE[facility]Y     @VACCINES  Objective   BP 99/66   Pulse 68   Ht 1.803 m (5' 10.98\")   Wt 99.6 kg (219 lb 8 oz)   SpO2 98%   BMI 30.63 kg/m²      Lab Results   Component Value Date    WBC 5.7 02/21/2023    HGB 14.2 02/21/2023    HCT 45.1 02/21/2023    MCV 88 02/21/2023     02/21/2023   PAP@MAMMOGRAM  Physical Exam  Constitutional: Alert and in no acute distress. Well developed, well nourished.   Eyes: Normal external exam. Pupils were equal in size, round, reactive to light (PERRL) with normal accommodation and extraocular movements intact (EOMI).   Ears, Nose, Mouth, and Throat: Otoscopic examination: Normal.  Hearing: Normal.    Neck: No neck mass was observed. Supple. Thyroid not enlarged and there were no palpable thyroid nodules.   Cardiovascular: Heart rate and rhythm were normal, normal S1 and S2, no gallops, no murmurs and no pericardial rub. Pedal pulses: Normal. No peripheral edema.   Pulmonary: No respiratory distress. Clear bilateral breath sounds.   Abdomen: Soft nontender; no abdominal mass palpated. No organomegaly.   Genitourinary: sees gym  Musculoskeletal: Gait and station: Normal.  Tenderness over light lateral epicondyles.  Resisted wrist extension painful.  Brachioradialis tendon also tender on right. Range of motion: Normal.  Muscle strength/tone: Normal.    Skin: No rash  Psychiatric: Judgment and insight: Intact. Mood and affect: Normal.   Lymphatic: No cervical lymphadenopathy.       Assessment/Plan   Problem List Items Addressed This Visit       Annual physical exam - Primary    Relevant Orders    Vitamin D 25-Hydroxy,Total (for eval of Vitamin D levels)    Lipid Panel    Basic Metabolic Panel     Wellness exam and counseling completed  Lipids and basic panel ordered  Vitamin D ordered since very low in the past  See endocrinology as scheduled  Discussed diet and exercise plan for at least 3 months before medication.  She does not have any medical conditions due to obesity  She is " currently off Zoloft for about a month.  This could help with weight loss plan  Plant-based options with adequate fiber and protein discussed    has lateral epicondylitis.  Wear a tennis elbow band.  Do massage and apply Voltaren gel.  May need therapy if persisting  Avoid heavy lifting for some time  Up-to-date with GYN care

## 2025-02-06 LAB
25(OH)D3+25(OH)D2 SERPL-MCNC: 24 NG/ML (ref 30–100)
ANION GAP SERPL CALCULATED.4IONS-SCNC: 9 MMOL/L (CALC) (ref 7–17)
BUN SERPL-MCNC: 11 MG/DL (ref 7–25)
BUN/CREAT SERPL: NORMAL (CALC) (ref 6–22)
CALCIUM SERPL-MCNC: 9.4 MG/DL (ref 8.6–10.2)
CHLORIDE SERPL-SCNC: 104 MMOL/L (ref 98–110)
CHOLEST SERPL-MCNC: 179 MG/DL
CHOLEST/HDLC SERPL: 3.3 (CALC)
CO2 SERPL-SCNC: 26 MMOL/L (ref 20–32)
CREAT SERPL-MCNC: 0.82 MG/DL (ref 0.5–0.99)
EGFRCR SERPLBLD CKD-EPI 2021: 92 ML/MIN/1.73M2
ERYTHROCYTE [DISTWIDTH] IN BLOOD BY AUTOMATED COUNT: 13.4 % (ref 11–15)
FERRITIN SERPL-MCNC: 7 NG/ML (ref 16–232)
FSH SERPL-ACNC: 4.1 MIU/ML
GLUCOSE SERPL-MCNC: 83 MG/DL (ref 65–99)
HCT VFR BLD AUTO: 43.1 % (ref 35–45)
HDLC SERPL-MCNC: 54 MG/DL
HGB BLD-MCNC: 14.2 G/DL (ref 11.7–15.5)
LDLC SERPL CALC-MCNC: 103 MG/DL (CALC)
MCH RBC QN AUTO: 29.2 PG (ref 27–33)
MCHC RBC AUTO-ENTMCNC: 32.9 G/DL (ref 32–36)
MCV RBC AUTO: 88.7 FL (ref 80–100)
NONHDLC SERPL-MCNC: 125 MG/DL (CALC)
PLATELET # BLD AUTO: 192 THOUSAND/UL (ref 140–400)
PMV BLD REES-ECKER: 12.5 FL (ref 7.5–12.5)
POTASSIUM SERPL-SCNC: 4.7 MMOL/L (ref 3.5–5.3)
RBC # BLD AUTO: 4.86 MILLION/UL (ref 3.8–5.1)
SODIUM SERPL-SCNC: 139 MMOL/L (ref 135–146)
TRIGL SERPL-MCNC: 120 MG/DL
WBC # BLD AUTO: 5 THOUSAND/UL (ref 3.8–10.8)

## 2025-02-11 ENCOUNTER — TELEPHONE (OUTPATIENT)
Dept: ENDOCRINOLOGY | Facility: CLINIC | Age: 40
End: 2025-02-11
Payer: COMMERCIAL

## 2025-02-11 DIAGNOSIS — E66.9 OBESITY (BMI 30-39.9): Primary | ICD-10-CM

## 2025-02-11 RX ORDER — TIRZEPATIDE 2.5 MG/.5ML
2.5 INJECTION, SOLUTION SUBCUTANEOUS
Qty: 4 EACH | Refills: 0 | Status: SHIPPED | OUTPATIENT
Start: 2025-02-11

## 2025-02-11 NOTE — TELEPHONE ENCOUNTER
Pt called in asking if you would prescribe her mounjaro to help with her weight as she now regrets not filling rx? Pt has upcoming ov 04/10/25 and is on the cancellation wait list but want to start something now if possible?

## 2025-03-06 ENCOUNTER — APPOINTMENT (OUTPATIENT)
Dept: PRIMARY CARE | Facility: CLINIC | Age: 40
End: 2025-03-06
Payer: COMMERCIAL

## 2025-04-02 ENCOUNTER — APPOINTMENT (OUTPATIENT)
Dept: ENDOCRINOLOGY | Facility: CLINIC | Age: 40
End: 2025-04-02
Payer: COMMERCIAL

## 2025-04-07 ENCOUNTER — APPOINTMENT (OUTPATIENT)
Dept: ENDOCRINOLOGY | Facility: CLINIC | Age: 40
End: 2025-04-07
Payer: COMMERCIAL

## 2025-06-09 ENCOUNTER — APPOINTMENT (OUTPATIENT)
Dept: ENDOCRINOLOGY | Facility: CLINIC | Age: 40
End: 2025-06-09
Payer: COMMERCIAL

## 2025-06-09 VITALS
HEIGHT: 71 IN | DIASTOLIC BLOOD PRESSURE: 76 MMHG | HEART RATE: 66 BPM | WEIGHT: 189.8 LBS | SYSTOLIC BLOOD PRESSURE: 120 MMHG | RESPIRATION RATE: 16 BRPM | BODY MASS INDEX: 26.57 KG/M2

## 2025-06-09 DIAGNOSIS — E03.9 HYPOTHYROIDISM, UNSPECIFIED TYPE: ICD-10-CM

## 2025-06-09 DIAGNOSIS — C73 THYROID CANCER (MULTI): Primary | ICD-10-CM

## 2025-06-09 PROCEDURE — 1036F TOBACCO NON-USER: CPT | Performed by: INTERNAL MEDICINE

## 2025-06-09 PROCEDURE — 99213 OFFICE O/P EST LOW 20 MIN: CPT | Performed by: INTERNAL MEDICINE

## 2025-06-09 PROCEDURE — 3008F BODY MASS INDEX DOCD: CPT | Performed by: INTERNAL MEDICINE

## 2025-06-09 RX ORDER — ESCITALOPRAM OXALATE 10 MG/1
10 TABLET ORAL DAILY
COMMUNITY
Start: 2025-05-16

## 2025-06-09 ASSESSMENT — ENCOUNTER SYMPTOMS
DIARRHEA: 0
PALPITATIONS: 0
FEVER: 0
VOMITING: 0
CHILLS: 0
SHORTNESS OF BREATH: 0
FATIGUE: 0
NAUSEA: 0
HEADACHES: 0
COUGH: 0

## 2025-06-09 NOTE — PROGRESS NOTES
Endocrinology: Follow up visit  Subjective   Patient ID: Catrina Bonilla is a 39 y.o. female who presents for Hypothyroidism and Thyroid Cancer.    PCP: Lucia Mcelroy MD    HPI  Here for yearly follow up   T4 at 175 every day    PTC 4 cm. s/p completion thyroidectomy and ovalles 4/2018.  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   Respiratory:  Negative for cough and shortness of breath.    Cardiovascular:  Negative for chest pain and palpitations.   Gastrointestinal:  Negative for diarrhea, nausea and vomiting.   Neurological:  Negative for headaches.       Problem List[1]     Home Meds:  Current Outpatient Medications   Medication Instructions    escitalopram (LEXAPRO) 10 mg, Daily    levothyroxine (SYNTHROID) 175 mcg, oral, Daily    spironolactone (Aldactone) 50 mg tablet 2 tablets (100 mg).    sulfacetamide sodium-sulfur 9-4.5 % cleanser WASH FACE & BACK ONCE DAILY.    Zepbound 2.5 mg, subcutaneous, Every 7 days        RX Allergies[2]     Objective   Vitals:    06/09/25 1440   BP: 120/76   Pulse: 66   Resp: 16      Vitals:    06/09/25 1440   Weight: 86.1 kg (189 lb 12.8 oz)      Body mass index is 26.49 kg/m².   Physical Exam  Constitutional:       Appearance: Normal appearance. She is overweight.   HENT:      Head: Normocephalic and atraumatic.   Neck:      Thyroid: No thyroid mass.      Comments: No palpable thyroid gland  Cardiovascular:      Rate and Rhythm: Normal rate and regular rhythm.      Heart sounds: No murmur heard.     No gallop.   Pulmonary:      Effort: Pulmonary effort is normal.      Breath sounds: Normal breath sounds.   Abdominal:      Palpations: Abdomen is soft.      Comments: benign   Neurological:      General: No focal deficit present.       "Mental Status: She is alert and oriented to person, place, and time.      Deep Tendon Reflexes: Reflexes are normal and symmetric.   Psychiatric:         Behavior: Behavior is cooperative.         Labs:  Lab Results   Component Value Date    HGBA1C 4.8 02/06/2023    TSH 0.68 06/05/2024    FREET4 1.44 06/05/2024      No results found for: \"PR1\", \"THYROIDPAB\", \"TSI\"     Assessment/Plan   Assessment & Plan  Thyroid cancer (Multi)  Tg with upcoming labs  No need for further imaging  Orders:    Thyroid Stimulating Hormone; Future    Thyroxine, Free; Future    Thyroglobulin and Antithyroglobulin; Future    Hypothyroidism, unspecified type  Recheck thyroid labs in near future  Orders:    Thyroid Stimulating Hormone; Future    Thyroxine, Free; Future    Thyroglobulin and Antithyroglobulin; Future        Electronically signed by:  Salima Trejo MD 06/09/25 3:00 PM                   [1]   Patient Active Problem List  Diagnosis    Acne    Anxiety    Hoarseness of voice    Hypothyroidism    Lesion of true vocal cord    Night sweats    Vitamin D deficiency    Vocal cord paralysis, unilateral partial    Weight gain    Annual physical exam   [2]   Allergies  Allergen Reactions    Adhesive Tape-Silicones Rash     "

## 2025-06-09 NOTE — ASSESSMENT & PLAN NOTE
Recheck thyroid labs in near future  Orders:    Thyroid Stimulating Hormone; Future    Thyroxine, Free; Future    Thyroglobulin and Antithyroglobulin; Future

## 2025-06-11 DIAGNOSIS — E03.9 HYPOTHYROIDISM, ADULT: ICD-10-CM

## 2025-06-13 LAB
T4 FREE SERPL-MCNC: 2.1 NG/DL (ref 0.8–1.8)
THYROGLOB AB SERPL-ACNC: NORMAL [IU]/ML
TSH SERPL-ACNC: 0.01 MIU/L

## 2025-06-16 DIAGNOSIS — E03.9 HYPOTHYROIDISM, ADULT: ICD-10-CM

## 2025-06-16 LAB
T4 FREE SERPL-MCNC: 2.1 NG/DL (ref 0.8–1.8)
THYROGLOB AB SERPL-ACNC: <1 IU/ML
THYROGLOB SERPL-MCNC: <0.1 NG/ML
TSH SERPL-ACNC: 0.01 MIU/L

## 2025-06-16 RX ORDER — LEVOTHYROXINE SODIUM 175 UG/1
175 TABLET ORAL DAILY
Qty: 90 TABLET | Refills: 3 | Status: SHIPPED | OUTPATIENT
Start: 2025-06-16 | End: 2025-06-17 | Stop reason: SDUPTHER

## 2025-06-16 RX ORDER — LEVOTHYROXINE SODIUM 175 UG/1
175 TABLET ORAL DAILY
Qty: 30 TABLET | Refills: 11 | OUTPATIENT
Start: 2025-06-16

## 2025-06-17 DIAGNOSIS — E03.9 HYPOTHYROIDISM, ADULT: ICD-10-CM

## 2025-06-17 RX ORDER — LEVOTHYROXINE SODIUM 175 UG/1
175 TABLET ORAL DAILY
Qty: 90 TABLET | Refills: 3 | Status: SHIPPED | OUTPATIENT
Start: 2025-06-17

## 2026-06-05 ENCOUNTER — APPOINTMENT (OUTPATIENT)
Facility: CLINIC | Age: 41
End: 2026-06-05
Payer: COMMERCIAL